# Patient Record
Sex: FEMALE | Race: WHITE
[De-identification: names, ages, dates, MRNs, and addresses within clinical notes are randomized per-mention and may not be internally consistent; named-entity substitution may affect disease eponyms.]

---

## 2017-02-20 ENCOUNTER — HOSPITAL ENCOUNTER (INPATIENT)
Dept: HOSPITAL 61 - SURG | Age: 57
LOS: 1 days | Discharge: HOME | DRG: 494 | End: 2017-02-21
Attending: ORTHOPAEDIC SURGERY | Admitting: ORTHOPAEDIC SURGERY
Payer: COMMERCIAL

## 2017-02-20 VITALS — DIASTOLIC BLOOD PRESSURE: 79 MMHG | SYSTOLIC BLOOD PRESSURE: 129 MMHG

## 2017-02-20 VITALS — SYSTOLIC BLOOD PRESSURE: 154 MMHG | DIASTOLIC BLOOD PRESSURE: 104 MMHG

## 2017-02-20 VITALS — DIASTOLIC BLOOD PRESSURE: 55 MMHG | SYSTOLIC BLOOD PRESSURE: 108 MMHG

## 2017-02-20 VITALS — SYSTOLIC BLOOD PRESSURE: 136 MMHG | DIASTOLIC BLOOD PRESSURE: 71 MMHG

## 2017-02-20 VITALS — WEIGHT: 168.31 LBS | BODY MASS INDEX: 28.04 KG/M2 | HEIGHT: 65 IN

## 2017-02-20 VITALS — SYSTOLIC BLOOD PRESSURE: 144 MMHG | DIASTOLIC BLOOD PRESSURE: 75 MMHG

## 2017-02-20 VITALS — DIASTOLIC BLOOD PRESSURE: 84 MMHG | SYSTOLIC BLOOD PRESSURE: 156 MMHG

## 2017-02-20 DIAGNOSIS — Z79.899: ICD-10-CM

## 2017-02-20 DIAGNOSIS — Z98.51: ICD-10-CM

## 2017-02-20 DIAGNOSIS — Z82.49: ICD-10-CM
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
## 2017-02-20 DIAGNOSIS — Z88.2: ICD-10-CM

## 2017-02-20 DIAGNOSIS — K21.9: ICD-10-CM

## 2017-02-20 DIAGNOSIS — S82.192G: Primary | ICD-10-CM

## 2017-02-20 DIAGNOSIS — E11.9: ICD-10-CM

## 2017-02-20 DIAGNOSIS — Z91.040: ICD-10-CM

## 2017-02-20 LAB
INR PPP: 1.1 (ref 0.8–1.1)
PROTHROMBIN TIME: 13.1 SEC (ref 11.7–14)

## 2017-02-20 PROCEDURE — 85610 PROTHROMBIN TIME: CPT

## 2017-02-20 PROCEDURE — 76000 FLUOROSCOPY <1 HR PHYS/QHP: CPT

## 2017-02-20 PROCEDURE — S0028 INJECTION, FAMOTIDINE, 20 MG: HCPCS

## 2017-02-20 PROCEDURE — 85018 HEMOGLOBIN: CPT

## 2017-02-20 PROCEDURE — 36415 COLL VENOUS BLD VENIPUNCTURE: CPT

## 2017-02-20 PROCEDURE — 85014 HEMATOCRIT: CPT

## 2017-02-20 PROCEDURE — 0QSH04Z REPOSITION LEFT TIBIA WITH INTERNAL FIXATION DEVICE, OPEN APPROACH: ICD-10-PCS | Performed by: ORTHOPAEDIC SURGERY

## 2017-02-20 RX ADMIN — SENNOSIDES AND DOCUSATE SODIUM SCH TAB: 8.6; 5 TABLET ORAL at 09:00

## 2017-02-20 RX ADMIN — HYDROCODONE BITARTRATE AND ACETAMINOPHEN PRN TAB: 7.5; 325 TABLET ORAL at 13:41

## 2017-02-20 RX ADMIN — CEFAZOLIN SCH MLS/HR: 1 INJECTION, POWDER, FOR SOLUTION INTRAVENOUS at 15:23

## 2017-02-20 RX ADMIN — MULTIPLE VITAMINS W/ MINERALS TAB SCH TAB: TAB at 09:00

## 2017-02-20 RX ADMIN — CEFAZOLIN PRN MLS/HR: 1 INJECTION, POWDER, FOR SOLUTION INTRAVENOUS at 12:20

## 2017-02-20 RX ADMIN — CEFAZOLIN PRN MLS/HR: 1 INJECTION, POWDER, FOR SOLUTION INTRAVENOUS at 12:19

## 2017-02-20 RX ADMIN — CEFAZOLIN SCH MLS/HR: 1 INJECTION, POWDER, FOR SOLUTION INTRAVENOUS at 20:37

## 2017-02-20 RX ADMIN — CEFAZOLIN PRN MLS/HR: 1 INJECTION, POWDER, FOR SOLUTION INTRAVENOUS at 07:43

## 2017-02-20 RX ADMIN — HYDROCODONE BITARTRATE AND ACETAMINOPHEN PRN TAB: 7.5; 325 TABLET ORAL at 11:44

## 2017-02-20 NOTE — OP
DATE OF SURGERY:  



SURGEON:  Randall Salamanca MD



ASSISTANT:  Sarah Ortiz.



ANESTHESIA:  General.



PREOPERATIVE DIAGNOSIS:  Left proximal third tibial fracture delayed union.



POSTOPERATIVE DIAGNOSIS:  Left proximal third tibial fracture delayed union.



PROCEDURE PERFORMED:

1.  Closed reduction intramedullary nailing of left tibial fracture, delayed

union.

2.  Application of bone graft, map3 product from [employer].



COMPONENTS INSERTED:

1.  A Stark [employer] Nephew 8 x 34 cm Trigen Clinton-Nail.

2.  Two proximal and 2 distal interlocking screws.



ESTIMATED BLOOD LOSS:  50 mL.



TOURNIQUET TIME:  20 minutes.



COMPLICATIONS:  None.



REASON FOR PROCEDURE:  The patient is a very pleasant 56-year-old female who

suffered a tibial plateau fracture treated with ORIF and bone grafting decades

ago.  Recently, she had suffered a transverse proximal third tibial shaft

fracture approximately 8-1/2 months ago.  We have been following this

clinically.  We had initially discussed operative versus nonoperative treatment

and she opted for nonoperative treatment.  Over the past, probably 4-6 weeks,

her pain again little bit worse and I felt she was settled into more varus.  She

did not have any progression of healing and had area of atrophic nonunion at the

anterior cortex.  She did have plenty of callus medially and laterally and

posteriorly on x-rays.  Because of this, we had a discussion of proceeding with

the above surgery with her and her  and they elected to proceed.



DESCRIPTION OF PROCEDURE:  The patient was greeted in the preoperative area by

myself.  Correct extremity was marked and verified.  She was taken to the

operative suite and antibiotics were started en route.  Once in the OR, she was

transferred gently supine to the OR table and had successful induction of

general anesthesia.  We then proceeded to secure to the bed and padded all

pressure points.  We then placed a nonsterile tourniquet to her left thigh.  We

then proceeded to prep and drape left lower extremity in usual sterile fashion

and conducted a standard preoperative timeout.  I then brought in the medium

sized triangle and flexed the knee up and then palpated, marked for her tibial

tuberosity patella and medial border of the patellar tendon and made a medial

incision over her knee and dissected subcutaneous tissue with electrocautery and

Largo  until I was through the paratenon.  I then bluntly dissected to

develop this layer with my digit.  I then used a guidewire under AP and lateral

at the knee to get a good starting point and trajectory.  I advanced a guidewire

down to the level of the callus.  I then gained entry into the proximal tibia

with the entry reamer.  I then attempted to pass the guidewire through the

callus and was unable to; therefore, I removed the guidewire and reintroduced

the guide pin and then using AP and lateral fluoroscopic imaging as well and

assistant holding valgus force at the fracture site.  I advanced the guide pin. 

It should be noted that prior to passing the hardware, I did perform somewhat of

a closed ostioclasis of this fracture to help maintain in a little bit better

position.  After advancing the guide pin through the callus, I then removed to

the guide pin and advanced the guidewire down confirming appropriate position

under AP and lateral fluoroscopic imaging at the distal tibia.  I then attempted

to pass the 8-1/2 reamer, but was unable to because of the callus.  I then 
withdrew

the guidewire and replaced the guide pin and then used a drill from the 7.3 mm

cannulated screw set to pass the callus.  I then withdrew the guide pin and

replaced the guidewire confirming good depth under AP and lateral fluoroscopic

imaging.  I then was able to ream with the 8-1/2 hand cutting reamer through the

callus.  After this, I reamed up and started getting really good shattered and 9

and went up to a 10 x half increments.  I then measured with lateral at the knee

again checking my guidewire was in good position distally.  I then selected my

nail and impacted my nail into position while my assistant was holding some

valgus force at the fracture site.  She was holding valgus force while reaming

as well.  With the nail seated and confirming good depth at the ankle and the

knee with fluoroscopy, I then placed two locking screws proximally through the

trocars after incising skin and bluntly dissecting down to bone with a hemostat

and then seated the trocars.  I then removed the trocar and the aiming arm

proximally for the nail and then using perfect circles technique, I placed two

distal interlocking screws at a 90 degree configuration.  After this, I used

C-arm to localize my fracture site and marked skin in accordance with this and

then made a skin incision and dissected down to the level of periosteum with

electrocautery and Bhavik.  I then incised the periosteum centered over the

fracture site.  At this point, I elected to place the tourniquet up.  It should

be noted that no tourniquet was insufflated while reaming was occurring for the

surgery.  With the tourniquet up, I then used a Duchesne, a curette and a small

rongeur to mobilize periosteum over the atrophic area of the fracture site. 

After debriding the fibrotic tissue, there really was not any callus, I then

placed my bone graft and impacted with digital pressure into this defect.  I

took care to work anteriorly, medially and as far laterally as I could.  With

bone graft packed in placed, I then closed periosteum with simple interrupted 0

Vicryl.  I then closed the deep layer and subcutaneous tissue with inverted

interrupted 0 Vicryl, and inverted interrupted 2-0 was used for the more

superficial subcutaneous tissue.  We then closed the stab incisions after

irrigating with staples.  The knee incision was then irrigated out with sterile

normal saline.  I then closed the peritenon with simple interrupted 0 Vicryl

followed by inverted interrupted 0 Vicryl for the deeper subcutaneous tissue and

the more superficial subcutaneous tissue was closed with inverted interrupted

2-0.  Running 4-0 Monocryl was buried subcuticular fashion was used for both

incisions.  The patient tolerated the surgery well.  At the conclusion of

surgery, the leg was cleansed and dried.  Xeroform sterile gauze and sterile

dressing were then applied as well as an Ace wrap to her left lower extremity. 

She tolerated surgery well.  No complications.  Prior to completion of wound

closure, all counts were reported correct x 2.  At the conclusion of surgery,

she was awakened from anesthesia, transferred gently supine to the hospital bed

and taken to PACU in stable and extubated condition.  Postop plan is to admit

her to the floor.  She will receive the DVT and antibiotic prophylaxis as well

as work with physical therapy.

 



______________________________

RANDALL SALAMANCA MD



DR:  NAPOLEON/marcus  JOB#:  [id number] / [id number]

DD:  02/20/2017 09:53  DT:  02/20/2017 23:09

BRIDGER

## 2017-02-20 NOTE — PDOC
BRIEF OPERATIVE NOTE


Date:  Feb 20, 2017


Pre-Op Diagnosis


L tibial fracture delayed union


Post-Op Diagnosis


same


Procedure Performed


CRIMN, grafting L tibial fx


Surgeon


Sosa


Assistant


Diana


Anesthesiologist


Hapgood


Anesthesia Type:  General, Local


Blood Loss


50mL


Findings


Atrophic area anteriorly at fracture site


Complications


none








AMBER SALAMANCA II, MD Feb 20, 2017 07:34

## 2017-02-21 VITALS
SYSTOLIC BLOOD PRESSURE: 115 MMHG | SYSTOLIC BLOOD PRESSURE: 115 MMHG | DIASTOLIC BLOOD PRESSURE: 57 MMHG | DIASTOLIC BLOOD PRESSURE: 57 MMHG | DIASTOLIC BLOOD PRESSURE: 57 MMHG | SYSTOLIC BLOOD PRESSURE: 115 MMHG

## 2017-02-21 VITALS — DIASTOLIC BLOOD PRESSURE: 62 MMHG | SYSTOLIC BLOOD PRESSURE: 128 MMHG

## 2017-02-21 VITALS — DIASTOLIC BLOOD PRESSURE: 55 MMHG | SYSTOLIC BLOOD PRESSURE: 122 MMHG

## 2017-02-21 VITALS — SYSTOLIC BLOOD PRESSURE: 124 MMHG | DIASTOLIC BLOOD PRESSURE: 55 MMHG

## 2017-02-21 LAB
HCT VFR BLD CALC: 35 % (ref 36–47)
HGB BLD-MCNC: 11.6 G/DL (ref 12–15.5)
INR PPP: 1.2 (ref 0.8–1.1)
MCHC RBC AUTO-ENTMCNC: 33 G/DL (ref 31–37)
PROTHROMBIN TIME: 14.8 SEC (ref 11.7–14)

## 2017-02-21 RX ADMIN — SENNOSIDES AND DOCUSATE SODIUM SCH TAB: 8.6; 5 TABLET ORAL at 08:24

## 2017-02-21 RX ADMIN — Medication PRN EACH: at 10:34

## 2017-02-21 RX ADMIN — MULTIPLE VITAMINS W/ MINERALS TAB SCH TAB: TAB at 08:24

## 2017-02-21 RX ADMIN — Medication PRN EACH: at 13:36

## 2017-02-21 RX ADMIN — HYDROCODONE BITARTRATE AND ACETAMINOPHEN PRN TAB: 7.5; 325 TABLET ORAL at 15:55

## 2017-02-21 RX ADMIN — HYDROCODONE BITARTRATE AND ACETAMINOPHEN PRN TAB: 7.5; 325 TABLET ORAL at 10:02

## 2017-02-21 RX ADMIN — HYDROCODONE BITARTRATE AND ACETAMINOPHEN PRN TAB: 7.5; 325 TABLET ORAL at 05:36

## 2017-02-21 RX ADMIN — CEFAZOLIN SCH MLS/HR: 1 INJECTION, POWDER, FOR SOLUTION INTRAVENOUS at 01:58

## 2017-02-21 NOTE — PDOC
ORTHO PROGRESS NOTES


Subjective


Her pain is tolerable. She was up with a walker yesterday, she got a little 

dizzy. No abdominal complaints. She complains of a scratchy throat. No 

difficulty breathing.


Vitals





 Vital Signs








  Date Time  Temp Pulse Resp B/P Pulse Ox O2 Delivery O2 Flow Rate FiO2


 


2/21/17 07:00  70 18 122/55 96 Room Air  


 


2/21/17 03:37 98.4       





 98.4       


 


2/20/17 18:11       2.0 








Labs





Laboratory Tests








Test


  2/20/17


12:50 2/21/17


05:25


 


Prothrombin Time


  13.1SEC


(11.7-14.0) 14.8SEC


(11.7-14.0)


 


Prothromb Time International


Ratio 1.1 (0.8-1.1) 


  1.2 (0.8-1.1) 


 


 


Hemoglobin


  


  11.6g/dL


(12.0-15.5)


 


Hematocrit


  


  35.0%


(36.0-47.0)


 


Mean Corpuscular Hemoglobin


Concent 


  33g/dL (31-37) 


 








Laboratory Tests








Test


  2/20/17


12:50 2/21/17


05:25


 


Prothrombin Time


  13.1SEC


(11.7-14.0) 14.8SEC


(11.7-14.0)


 


Prothromb Time International


Ratio 1.1 (0.8-1.1) 


  1.2 (0.8-1.1) 


 


 


Hemoglobin


  


  11.6g/dL


(12.0-15.5)


 


Hematocrit


  


  35.0%


(36.0-47.0)


 


Mean Corpuscular Hemoglobin


Concent 


  33g/dL (31-37) 


 








Notes


She is awake and alert and in bed. Examination of her left lower extremity 

reveals some bloody drainage proximally. The remainder of the incisions are 

clean dry and intact. Compartments are soft. No pain with passive range of 

motion. Results pedis 2+. Sensation intact to light touch throughout the 

forefoot. EHL and FHL are 5/5.


Assessment and Plan


We will see how she does with therapy today, she may go home later today. We 

will plan on Coumadin for anticoagulation, but she would like to discuss this 

with family.








AMBER SALAMANCA II, MD Feb 21, 2017 08:57

## 2017-02-21 NOTE — DISCH
DISCHARGE INSTRUCTIONS


Condition on Discharge


Condition on Discharge:  Stable





Activity After Discharge


Activity Instructions for Disc:  Other, see below


Bathing Instructions:  Shower-keep dressing dry


Weight Bearing Status after Di:  As tolerated





Diet after Discharge


Diet after Discharge:  Regular





Wound Incision Care


Wound/Incision Care:  Ice to area for comfort, Keep wound/cast CDI, Change 

dressing





Contacting the [facility] after DC


Call your doctor for:  Concerns you may have





Follow-Up


Follow up with:  Sosa in 2wks





Treatment/Equipment after DC


Adaptive Equipment Issued:  Crutches





Warfarin Follow-Up


Warfarin Follow UP:  per Pharmacy








AMBER SALAMANCA II, MD Feb 21, 2017 08:49

## 2017-02-22 NOTE — PDOC3
Discharge Summary


Visit Information


Date of Admission:  Feb 20, 2017


Date of Discharge:  Feb 21, 2017


Admitting Diagnosis:  delayed union left tibial fracture


Final Diagnosis


 Problems


Medical Problems:


(1) Tibial fracture


Status: Acute  











Brief Hospital Course


Allergies





 Allergies








Coded Allergies Type Severity Reaction Last Updated Verified


 


  Latex, Natural Rubber Allergy Severe eyes swell shut, copiuos amounts of snot 

5/17/14 Yes


 


  Sulfa (Sulfonamide Antibiotics) Allergy Intermediate rash 5/17/14 Yes


 


  nickel Allergy Intermediate Rash 2/21/17 Yes








Vital Signs





 Vital Signs








  Date Time  Temp Pulse Resp B/P Pulse Ox O2 Delivery O2 Flow Rate FiO2


 


2/21/17 16:56      Room Air  


 


2/21/17 15:00 97.7 75 18 115/57 96   





 97.7       








Lab Results





Laboratory Tests








Test


  2/20/17


12:50 2/21/17


05:25


 


Prothrombin Time


  13.1SEC


(11.7-14.0) 14.8SEC


(11.7-14.0)


 


Prothromb Time International


Ratio 1.1 (0.8-1.1) 


  1.2 (0.8-1.1) 


 


 


Hemoglobin


  


  11.6g/dL


(12.0-15.5)


 


Hematocrit


  


  35.0%


(36.0-47.0)


 


Mean Corpuscular Hemoglobin


Concent 


  33g/dL (31-37) 


 








Brief Hospital Course


Ms. Rm  is a 56 old female who had seen initially in consultation after 

a ground-level fall resulted in proximal third tibial fracture several months 

ago. She had initially progressed well after we discussed treatment options and 

she elected for nonoperative treatment. Lately, she had drifted into slight 

varus and had worsening pain. Because of this we had a discussion the risks, 

benefits, and alternatives after clinical and radiographic evidence and 

clinical evidence seemed to show a delayed union. Her  discuss this and 

answered her questions and elected proceed with surgery. She tolerated surgery 

well and recovered well from anesthesia and the PACU. After this she was taken 

to the surgical floor for DVT and antibiotic prophylaxis as well as IV pain 

medicine and to begin a rehabilitation. She progressed well with physical 

therapy and was able to maintain her ADLs and ambulate with crutches prior to 

discharge. She remained hemodynamically stable and afebrile. Her pain was 

controlled on oral pain medicine. Normal bowel and bladder function. Her 

hospital course was essentially uneventful.





Discharge Information


Condition at Discharge:  Stable


Follow Up:  Weeks


Disposition/Orders:  D/C to Home


Scheduled


Ergocalciferol (Vitamin D2) (Vitamin D2) 1 CAP PO WEEKLY (Reported) 


Levothyroxine Sodium (Levothyroxine Sodium) 1 TAB PO DAILY (Reported) 


Ranitidine Hcl (Zantac) 1 TAB PO BID (Reported) 


Simvastatin (Simvastatin) 1 TAB PO QHS (Reported) 


Tramadol Hcl (Tramadol Hcl) 1 TAB PO PRN Q6HRS (Reported) 





Scheduled PRN


Acyclovir (Acyclovir) 1 TAB PO PRN TID PRN PRN SEE COMMENTS (Reported) 


Hydrocodone/Apap 5-325 (Norco 5-325 Tablet) 1-2 TAB PO PRN Q6HRS PRN PRN PAIN (

Reported) 


Zolpidem Tartrate (Ambien) 1 TAB PO PRN QHS PRN PRN INSOMNIA (Reported) 





Miscellaneous Medications


Fexofenadine Hcl (Allegra Allergy) 180 MG PO (Reported) 





Discontinued Medications


Aspirin (Aspirin) 1 TAB PO DAILY (Reported) 


Ibuprofen (Ibuprofen) 400 MG PO PRN PRN PRN INFLAMMATION (Reported) 





Patient Instructions


Patient Instructions


She'll be discharged weightbearing as tolerated with crutches as needed. Wound 

care instructions were given. She will be on Coumadin for DVT prophylaxis. We'

ll see her back in 2 weeks, sooner should problems arise








AMBER SALAMANCA II, MD Feb 22, 2017 10:55

## 2017-03-22 ENCOUNTER — HOSPITAL ENCOUNTER (OUTPATIENT)
Dept: HOSPITAL 61 - SPEC | Age: 57
Discharge: HOME | End: 2017-03-22
Attending: FAMILY MEDICINE
Payer: COMMERCIAL

## 2017-03-22 DIAGNOSIS — S81.802A: Primary | ICD-10-CM

## 2017-03-22 PROCEDURE — 87070 CULTURE OTHR SPECIMN AEROBIC: CPT

## 2017-03-22 PROCEDURE — 87205 SMEAR GRAM STAIN: CPT

## 2018-02-15 ENCOUNTER — HOSPITAL ENCOUNTER (OUTPATIENT)
Dept: HOSPITAL 61 - MAMMO | Age: 58
Discharge: HOME | End: 2018-02-15
Attending: FAMILY MEDICINE
Payer: COMMERCIAL

## 2018-02-15 DIAGNOSIS — Z12.31: Primary | ICD-10-CM

## 2018-02-15 PROCEDURE — 77063 BREAST TOMOSYNTHESIS BI: CPT

## 2018-02-15 PROCEDURE — 77067 SCR MAMMO BI INCL CAD: CPT

## 2019-03-14 ENCOUNTER — HOSPITAL ENCOUNTER (OUTPATIENT)
Dept: HOSPITAL 61 - MAMMO | Age: 59
Discharge: HOME | End: 2019-03-14
Attending: FAMILY MEDICINE
Payer: COMMERCIAL

## 2019-03-14 DIAGNOSIS — R92.8: ICD-10-CM

## 2019-03-14 DIAGNOSIS — Z12.31: Primary | ICD-10-CM

## 2019-03-14 PROCEDURE — 77067 SCR MAMMO BI INCL CAD: CPT

## 2019-03-14 PROCEDURE — 77063 BREAST TOMOSYNTHESIS BI: CPT

## 2019-03-14 NOTE — RAD
DATE: 3/14/2019



EXAM: MAMMO MEREDITH SCREENING BILATERAL



HISTORY: Routine screening



COMPARISON: 2/15/2018



This study was interpreted with the benefit of Computerized Aided Detection

(CAD).





Breast Density: HETERO The breast parenchyma is heterogenously dense, which

could reduce sensitivity of mammography. Breast parenchyma level C.





FINDINGS: 2-D and 3-D tomosynthesis imaging was performed in CC and MLO

projections.  No new or enlarging breast densities are seen.  Minimal benign

type calcification is present.  No suspicious microcalcifications have

developed.  





IMPRESSION: Stable mammograms without evidence of malignancy.





BI-RADS CATEGORY: 2 BENIGN FINDING(S)



RECOMMENDED FOLLOW-UP: 12M 12 MONTH FOLLOW-UP



PQRS compliance statement: Patient information was entered into a reminder

system with a target due date     for the next mammogram.



Mammography is a sensitive method for finding small breast cancers, but it

does not detect them all and is not a substitute for careful clinical

examination.  A negative mammogram does not negate a clinically suspicious

finding and should not result in delay in biopsying a clinically suspicious

abnormality.



"Our facility is accredited by the [employer] Mammography

Program."

## 2019-04-10 ENCOUNTER — HOSPITAL ENCOUNTER (OUTPATIENT)
Dept: HOSPITAL 61 - SURG | Age: 59
Discharge: HOME | End: 2019-04-10
Attending: INTERNAL MEDICINE
Payer: COMMERCIAL

## 2019-04-10 VITALS
SYSTOLIC BLOOD PRESSURE: 103 MMHG | DIASTOLIC BLOOD PRESSURE: 61 MMHG | DIASTOLIC BLOOD PRESSURE: 61 MMHG | SYSTOLIC BLOOD PRESSURE: 103 MMHG | SYSTOLIC BLOOD PRESSURE: 103 MMHG | DIASTOLIC BLOOD PRESSURE: 61 MMHG | SYSTOLIC BLOOD PRESSURE: 103 MMHG | DIASTOLIC BLOOD PRESSURE: 61 MMHG | DIASTOLIC BLOOD PRESSURE: 61 MMHG | SYSTOLIC BLOOD PRESSURE: 103 MMHG | DIASTOLIC BLOOD PRESSURE: 61 MMHG | DIASTOLIC BLOOD PRESSURE: 61 MMHG | SYSTOLIC BLOOD PRESSURE: 103 MMHG | SYSTOLIC BLOOD PRESSURE: 103 MMHG | SYSTOLIC BLOOD PRESSURE: 103 MMHG | DIASTOLIC BLOOD PRESSURE: 61 MMHG | SYSTOLIC BLOOD PRESSURE: 103 MMHG | DIASTOLIC BLOOD PRESSURE: 61 MMHG

## 2019-04-10 DIAGNOSIS — E03.9: ICD-10-CM

## 2019-04-10 DIAGNOSIS — Z88.2: ICD-10-CM

## 2019-04-10 DIAGNOSIS — Z86.010: ICD-10-CM

## 2019-04-10 DIAGNOSIS — E78.5: ICD-10-CM

## 2019-04-10 DIAGNOSIS — K64.0: ICD-10-CM

## 2019-04-10 DIAGNOSIS — Z91.040: ICD-10-CM

## 2019-04-10 DIAGNOSIS — Z79.899: ICD-10-CM

## 2019-04-10 DIAGNOSIS — Z98.51: ICD-10-CM

## 2019-04-10 DIAGNOSIS — K21.9: ICD-10-CM

## 2019-04-10 DIAGNOSIS — Z80.0: ICD-10-CM

## 2019-04-10 DIAGNOSIS — Z98.890: ICD-10-CM

## 2019-04-10 DIAGNOSIS — K57.30: Primary | ICD-10-CM

## 2019-04-10 DIAGNOSIS — I10: ICD-10-CM

## 2019-04-10 DIAGNOSIS — Z79.82: ICD-10-CM

## 2019-04-10 DIAGNOSIS — Z72.89: ICD-10-CM

## 2019-04-10 DIAGNOSIS — Z82.49: ICD-10-CM

## 2019-04-10 PROCEDURE — 45378 DIAGNOSTIC COLONOSCOPY: CPT

## 2019-04-10 NOTE — CONS
DATE OF CONSULTATION:  



REFERRING PHYSICIAN:  Dr. Jesús Ruiz.



HISTORY OF PRESENT ILLNESS:  This 59-year-old  female with past medical

history significant for colonic polyps, hyperlipidemia, hypertension,

hypothyroidism and GERD as well as diverticulosis is seen for interval exam. 

She has had no diarrhea or constipation, no melena or hematochezia.  Weight and

appetite are stable.  Cologuard test was positive for prior history of polyps,

so she is here today for further evaluation.



PAST MEDICAL HISTORY:  Hypothyroidism, hypertension, GERD, and hyperlipidemia.



ALLERGIES:  LATEX AND SULFA.



MEDICATIONS:  Include aspirin, vitamin D, Allegra, levothyroxine, lisinopril,

omeprazole, simvastatin, triamterene/hydrochlorothiazide and zolpidem.



SOCIAL HISTORY:  Social drinker, nonsmoker.



FAMILY HISTORY:  Significant for stomach cancer and coronary artery disease.



REVIEW OF SYSTEMS:  Per records.



PHYSICAL EXAMINATION:

GENERAL:  Reveals a well-nourished, well-developed  female who is

alert, in no acute distress.

VITAL SIGNS:  Pulse is 85, respirations 18, blood pressure is 130/75.

HEENT:  Normocephalic and atraumatic head.  Pupils and extraocular muscles are

not tested.  Sclerae anicteric.

NECK:  Supple.

LUNGS:  Clear.

CARDIOVASCULAR:  Reveals an S1, S2 without S3, S4 or appreciable murmur.

ABDOMEN:  With a soft abdomen, normal bowel sounds without appreciable

hepatosplenomegaly.

EXTREMITIES:  Reveals no cyanosis, clubbing or edema.



IMPRESSION AND PLAN:  Colorectal screening with a positive Cologuard and history

of colonic polyps is warranted at this time.  Risks and benefits of procedure

including risk of hemorrhage and perforation discussed.  The patient is willing

to proceed.

 



______________________________

DAVID MENESES MD



DR:  SSP/marcus  JOB#:  [id number] / [id number]

DD:  04/10/2019 08:20  DT:  04/10/2019 22:05

## 2019-06-07 ENCOUNTER — HOSPITAL ENCOUNTER (OUTPATIENT)
Dept: HOSPITAL 61 - LAB | Age: 59
Discharge: HOME | End: 2019-06-07
Attending: FAMILY MEDICINE
Payer: COMMERCIAL

## 2019-06-07 DIAGNOSIS — E03.9: ICD-10-CM

## 2019-06-07 DIAGNOSIS — Z91.040: ICD-10-CM

## 2019-06-07 DIAGNOSIS — R73.09: ICD-10-CM

## 2019-06-07 DIAGNOSIS — Z88.2: ICD-10-CM

## 2019-06-07 DIAGNOSIS — E78.5: Primary | ICD-10-CM

## 2019-06-07 DIAGNOSIS — Z88.8: ICD-10-CM

## 2019-06-07 LAB
ALBUMIN SERPL-MCNC: 4.6 G/DL (ref 3.4–5)
ALBUMIN/GLOB SERPL: 1.3 {RATIO} (ref 1–1.7)
ALP SERPL-CCNC: 72 U/L (ref 46–116)
ALT SERPL-CCNC: 28 U/L (ref 14–59)
ANION GAP SERPL CALC-SCNC: 11 MMOL/L (ref 6–14)
AST SERPL-CCNC: 18 U/L (ref 15–37)
BASOPHILS # BLD AUTO: 0.1 X10^3/UL (ref 0–0.2)
BASOPHILS NFR BLD: 1 % (ref 0–3)
BILIRUB SERPL-MCNC: 0.7 MG/DL (ref 0.2–1)
BUN SERPL-MCNC: 21 MG/DL (ref 7–20)
BUN/CREAT SERPL: 19 (ref 6–20)
CALCIUM SERPL-MCNC: 9.9 MG/DL (ref 8.5–10.1)
CHLORIDE SERPL-SCNC: 99 MMOL/L (ref 98–107)
CHOLEST SERPL-MCNC: 183 MG/DL (ref 0–200)
CHOLEST/HDLC SERPL: 2.8 {RATIO}
CO2 SERPL-SCNC: 29 MMOL/L (ref 21–32)
CREAT SERPL-MCNC: 1.1 MG/DL (ref 0.6–1)
EOSINOPHIL NFR BLD: 0.1 X10^3/UL (ref 0–0.7)
EOSINOPHIL NFR BLD: 2 % (ref 0–3)
ERYTHROCYTE [DISTWIDTH] IN BLOOD BY AUTOMATED COUNT: 14.2 % (ref 11.5–14.5)
GFR SERPLBLD BASED ON 1.73 SQ M-ARVRAT: 50.5 ML/MIN
GLOBULIN SER-MCNC: 3.6 G/DL (ref 2.2–3.8)
GLUCOSE SERPL-MCNC: 124 MG/DL (ref 70–99)
HCT VFR BLD CALC: 36.8 % (ref 36–47)
HDLC SERPL-MCNC: 65 MG/DL (ref 40–60)
HGB BLD-MCNC: 12.5 G/DL (ref 12–15.5)
LDLC: 108 MG/DL (ref 0–100)
LYMPHOCYTES # BLD: 1.8 X10^3/UL (ref 1–4.8)
LYMPHOCYTES NFR BLD AUTO: 27 % (ref 24–48)
MCH RBC QN AUTO: 30 PG (ref 25–35)
MCHC RBC AUTO-ENTMCNC: 34 G/DL (ref 31–37)
MCV RBC AUTO: 89 FL (ref 79–100)
MONO #: 0.6 X10^3/UL (ref 0–1.1)
MONOCYTES NFR BLD: 10 % (ref 0–9)
NEUT #: 4.1 X10^3UL (ref 1.8–7.7)
NEUTROPHILS NFR BLD AUTO: 61 % (ref 31–73)
PLATELET # BLD AUTO: 226 X10^3/UL (ref 140–400)
POTASSIUM SERPL-SCNC: 4 MMOL/L (ref 3.5–5.1)
PROT SERPL-MCNC: 8.2 G/DL (ref 6.4–8.2)
RBC # BLD AUTO: 4.12 X10^6/UL (ref 3.5–5.4)
SODIUM SERPL-SCNC: 139 MMOL/L (ref 136–145)
T4 FREE SERPL-MCNC: 1.24 NG/DL (ref 0.76–1.46)
THYROID STIM HORMONE (TSH): 2.04 UIU/ML (ref 0.36–3.74)
TRIGL SERPL-MCNC: 48 MG/DL (ref 0–150)
VLDLC: 10 MG/DL (ref 0–40)
WBC # BLD AUTO: 6.8 X10^3/UL (ref 4–11)

## 2019-06-07 PROCEDURE — 36415 COLL VENOUS BLD VENIPUNCTURE: CPT

## 2019-06-07 PROCEDURE — 85025 COMPLETE CBC W/AUTO DIFF WBC: CPT

## 2019-06-07 PROCEDURE — 80061 LIPID PANEL: CPT

## 2019-06-07 PROCEDURE — 83036 HEMOGLOBIN GLYCOSYLATED A1C: CPT

## 2019-06-07 PROCEDURE — 82306 VITAMIN D 25 HYDROXY: CPT

## 2019-06-07 PROCEDURE — 84443 ASSAY THYROID STIM HORMONE: CPT

## 2019-06-07 PROCEDURE — 80053 COMPREHEN METABOLIC PANEL: CPT

## 2019-06-07 PROCEDURE — 84439 ASSAY OF FREE THYROXINE: CPT

## 2019-06-08 LAB — HBA1C MFR BLD: 5.8 % (ref 4.8–5.6)

## 2020-01-02 ENCOUNTER — HOSPITAL ENCOUNTER (OUTPATIENT)
Dept: HOSPITAL 61 - LAB | Age: 60
Discharge: HOME | End: 2020-01-02
Attending: FAMILY MEDICINE
Payer: COMMERCIAL

## 2020-01-02 DIAGNOSIS — E78.5: ICD-10-CM

## 2020-01-02 DIAGNOSIS — E74.39: Primary | ICD-10-CM

## 2020-01-02 DIAGNOSIS — E03.9: ICD-10-CM

## 2020-01-02 LAB
ALBUMIN SERPL-MCNC: 4.3 G/DL (ref 3.4–5)
ALBUMIN/GLOB SERPL: 1.2 {RATIO} (ref 1–1.7)
ALP SERPL-CCNC: 64 U/L (ref 46–116)
ALT SERPL-CCNC: 25 U/L (ref 14–59)
ANION GAP SERPL CALC-SCNC: 12 MMOL/L (ref 6–14)
AST SERPL-CCNC: 16 U/L (ref 15–37)
BILIRUB SERPL-MCNC: 1 MG/DL (ref 0.2–1)
BUN SERPL-MCNC: 32 MG/DL (ref 7–20)
BUN/CREAT SERPL: 36 (ref 6–20)
CALCIUM SERPL-MCNC: 9.4 MG/DL (ref 8.5–10.1)
CHLORIDE SERPL-SCNC: 98 MMOL/L (ref 98–107)
CHOLEST SERPL-MCNC: 199 MG/DL (ref 0–200)
CHOLEST/HDLC SERPL: 2.9 {RATIO}
CO2 SERPL-SCNC: 26 MMOL/L (ref 21–32)
CREAT SERPL-MCNC: 0.9 MG/DL (ref 0.6–1)
GFR SERPLBLD BASED ON 1.73 SQ M-ARVRAT: 63.7 ML/MIN
GLOBULIN SER-MCNC: 3.6 G/DL (ref 2.2–3.8)
GLUCOSE SERPL-MCNC: 117 MG/DL (ref 70–99)
HDLC SERPL-MCNC: 68 MG/DL (ref 40–60)
LDLC: 118 MG/DL (ref 0–100)
POTASSIUM SERPL-SCNC: 4 MMOL/L (ref 3.5–5.1)
PROT SERPL-MCNC: 7.9 G/DL (ref 6.4–8.2)
SODIUM SERPL-SCNC: 136 MMOL/L (ref 136–145)
T4 FREE SERPL-MCNC: 1.25 NG/DL (ref 0.76–1.46)
THYROID STIM HORMONE (TSH): 0.16 UIU/ML (ref 0.36–3.74)
TRIGL SERPL-MCNC: 67 MG/DL (ref 0–150)
VLDLC: 13 MG/DL (ref 0–40)

## 2020-01-02 PROCEDURE — 80053 COMPREHEN METABOLIC PANEL: CPT

## 2020-01-02 PROCEDURE — 84443 ASSAY THYROID STIM HORMONE: CPT

## 2020-01-02 PROCEDURE — 36415 COLL VENOUS BLD VENIPUNCTURE: CPT

## 2020-01-02 PROCEDURE — 84439 ASSAY OF FREE THYROXINE: CPT

## 2020-01-02 PROCEDURE — 80061 LIPID PANEL: CPT

## 2020-01-02 PROCEDURE — 83036 HEMOGLOBIN GLYCOSYLATED A1C: CPT

## 2020-01-03 LAB — HBA1C MFR BLD: 5.7 % (ref 4.8–5.6)

## 2020-11-09 ENCOUNTER — HOSPITAL ENCOUNTER (OUTPATIENT)
Dept: HOSPITAL 61 - LAB | Age: 60
End: 2020-11-09
Attending: FAMILY MEDICINE
Payer: COMMERCIAL

## 2020-11-09 DIAGNOSIS — E03.9: ICD-10-CM

## 2020-11-09 DIAGNOSIS — E74.39: Primary | ICD-10-CM

## 2020-11-09 LAB
ANION GAP SERPL CALC-SCNC: 8 MMOL/L (ref 6–14)
BUN SERPL-MCNC: 18 MG/DL (ref 7–20)
CALCIUM SERPL-MCNC: 9.6 MG/DL (ref 8.5–10.1)
CHLORIDE SERPL-SCNC: 96 MMOL/L (ref 98–107)
CO2 SERPL-SCNC: 29 MMOL/L (ref 21–32)
CREAT SERPL-MCNC: 0.8 MG/DL (ref 0.6–1)
GFR SERPLBLD BASED ON 1.73 SQ M-ARVRAT: 72.7 ML/MIN
GLUCOSE SERPL-MCNC: 112 MG/DL (ref 70–99)
POTASSIUM SERPL-SCNC: 4 MMOL/L (ref 3.5–5.1)
SODIUM SERPL-SCNC: 133 MMOL/L (ref 136–145)
T4 FREE SERPL-MCNC: 1.58 NG/DL (ref 0.76–1.46)
THYROID STIM HORMONE (TSH): 0.28 UIU/ML (ref 0.36–3.74)

## 2020-11-09 PROCEDURE — 84443 ASSAY THYROID STIM HORMONE: CPT

## 2020-11-09 PROCEDURE — 84439 ASSAY OF FREE THYROXINE: CPT

## 2020-11-09 PROCEDURE — 36415 COLL VENOUS BLD VENIPUNCTURE: CPT

## 2020-11-09 PROCEDURE — 83036 HEMOGLOBIN GLYCOSYLATED A1C: CPT

## 2020-11-09 PROCEDURE — 80048 BASIC METABOLIC PNL TOTAL CA: CPT

## 2020-11-10 LAB — HBA1C MFR BLD: 5.5 % (ref 4.8–5.6)

## 2021-03-11 ENCOUNTER — HOSPITAL ENCOUNTER (OUTPATIENT)
Dept: HOSPITAL 61 - LAB | Age: 61
End: 2021-03-11
Attending: FAMILY MEDICINE
Payer: COMMERCIAL

## 2021-03-11 ENCOUNTER — HOSPITAL ENCOUNTER (OUTPATIENT)
Dept: HOSPITAL 61 - LAB | Age: 61
End: 2021-03-11
Payer: COMMERCIAL

## 2021-03-11 DIAGNOSIS — L23.9: Primary | ICD-10-CM

## 2021-03-11 DIAGNOSIS — E74.39: Primary | ICD-10-CM

## 2021-03-11 LAB
ALBUMIN SERPL-MCNC: 3.8 G/DL (ref 3.4–5)
ALBUMIN SERPL-MCNC: 3.8 G/DL (ref 3.4–5)
ALBUMIN/GLOB SERPL: 1.1 {RATIO} (ref 1–1.7)
ALBUMIN/GLOB SERPL: 1.1 {RATIO} (ref 1–1.7)
ALP SERPL-CCNC: 75 U/L (ref 46–116)
ALP SERPL-CCNC: 75 U/L (ref 46–116)
ALT SERPL-CCNC: 34 U/L (ref 14–59)
ALT SERPL-CCNC: 34 U/L (ref 14–59)
ANION GAP SERPL CALC-SCNC: 7 MMOL/L (ref 6–14)
ANION GAP SERPL CALC-SCNC: 7 MMOL/L (ref 6–14)
AST SERPL-CCNC: 19 U/L (ref 15–37)
AST SERPL-CCNC: 19 U/L (ref 15–37)
BASOPHILS # BLD AUTO: 0.1 X10^3/UL (ref 0–0.2)
BASOPHILS NFR BLD: 1 % (ref 0–3)
BILIRUB SERPL-MCNC: 0.7 MG/DL (ref 0.2–1)
BILIRUB SERPL-MCNC: 0.7 MG/DL (ref 0.2–1)
BUN SERPL-MCNC: 20 MG/DL (ref 7–20)
BUN SERPL-MCNC: 20 MG/DL (ref 7–20)
BUN/CREAT SERPL: 25 (ref 6–20)
BUN/CREAT SERPL: 25 (ref 6–20)
CALCIUM SERPL-MCNC: 8.8 MG/DL (ref 8.5–10.1)
CALCIUM SERPL-MCNC: 8.8 MG/DL (ref 8.5–10.1)
CHLORIDE SERPL-SCNC: 101 MMOL/L (ref 98–107)
CHLORIDE SERPL-SCNC: 101 MMOL/L (ref 98–107)
CHOLEST SERPL-MCNC: 200 MG/DL (ref 0–200)
CHOLEST/HDLC SERPL: 2.9 {RATIO}
CO2 SERPL-SCNC: 28 MMOL/L (ref 21–32)
CO2 SERPL-SCNC: 28 MMOL/L (ref 21–32)
CREAT SERPL-MCNC: 0.8 MG/DL (ref 0.6–1)
CREAT SERPL-MCNC: 0.8 MG/DL (ref 0.6–1)
EOSINOPHIL NFR BLD: 0.2 X10^3/UL (ref 0–0.7)
EOSINOPHIL NFR BLD: 3 % (ref 0–3)
ERYTHROCYTE [DISTWIDTH] IN BLOOD BY AUTOMATED COUNT: 13.9 % (ref 11.5–14.5)
ERYTHROCYTE [DISTWIDTH] IN BLOOD BY AUTOMATED COUNT: 14.2 % (ref 11.5–14.5)
GFR SERPLBLD BASED ON 1.73 SQ M-ARVRAT: 72.7 ML/MIN
GFR SERPLBLD BASED ON 1.73 SQ M-ARVRAT: 72.7 ML/MIN
GLUCOSE SERPL-MCNC: 99 MG/DL (ref 70–99)
GLUCOSE SERPL-MCNC: 99 MG/DL (ref 70–99)
HCT VFR BLD CALC: 41.3 % (ref 36–47)
HCT VFR BLD CALC: 42 % (ref 36–47)
HDLC SERPL-MCNC: 70 MG/DL (ref 40–60)
HGB BLD-MCNC: 14.2 G/DL (ref 12–15.5)
HGB BLD-MCNC: 14.3 G/DL (ref 12–15.5)
LDLC: 117 MG/DL (ref 0–100)
LYMPHOCYTES # BLD: 1.5 X10^3/UL (ref 1–4.8)
LYMPHOCYTES NFR BLD AUTO: 23 % (ref 24–48)
MCH RBC QN AUTO: 31 PG (ref 25–35)
MCH RBC QN AUTO: 31 PG (ref 25–35)
MCHC RBC AUTO-ENTMCNC: 34 G/DL (ref 31–37)
MCHC RBC AUTO-ENTMCNC: 34 G/DL (ref 31–37)
MCV RBC AUTO: 89 FL (ref 79–100)
MCV RBC AUTO: 89 FL (ref 79–100)
MONO #: 0.7 X10^3/UL (ref 0–1.1)
MONOCYTES NFR BLD: 10 % (ref 0–9)
NEUT #: 4.2 X10^3/UL (ref 1.8–7.7)
NEUTROPHILS NFR BLD AUTO: 64 % (ref 31–73)
PLATELET # BLD AUTO: 235 X10^3/UL (ref 140–400)
PLATELET # BLD AUTO: 237 X10^3/UL (ref 140–400)
POTASSIUM SERPL-SCNC: 4.4 MMOL/L (ref 3.5–5.1)
POTASSIUM SERPL-SCNC: 4.4 MMOL/L (ref 3.5–5.1)
PROT SERPL-MCNC: 7.4 G/DL (ref 6.4–8.2)
PROT SERPL-MCNC: 7.4 G/DL (ref 6.4–8.2)
RBC # BLD AUTO: 4.64 X10^6/UL (ref 3.5–5.4)
RBC # BLD AUTO: 4.7 X10^6/UL (ref 3.5–5.4)
SODIUM SERPL-SCNC: 136 MMOL/L (ref 136–145)
SODIUM SERPL-SCNC: 136 MMOL/L (ref 136–145)
T4 FREE SERPL-MCNC: 1.43 NG/DL (ref 0.76–1.46)
T4 FREE SERPL-MCNC: 1.43 NG/DL (ref 0.76–1.46)
THYROID STIM HORMONE (TSH): 0.4 UIU/ML (ref 0.36–3.74)
THYROID STIM HORMONE (TSH): 0.4 UIU/ML (ref 0.36–3.74)
TRIGL SERPL-MCNC: 64 MG/DL (ref 0–150)
VLDLC: 13 MG/DL (ref 0–40)
WBC # BLD AUTO: 6.6 X10^3/UL (ref 4–11)
WBC # BLD AUTO: 6.6 X10^3/UL (ref 4–11)

## 2021-03-11 PROCEDURE — 85027 COMPLETE CBC AUTOMATED: CPT

## 2021-03-11 PROCEDURE — 83036 HEMOGLOBIN GLYCOSYLATED A1C: CPT

## 2021-03-11 PROCEDURE — 84443 ASSAY THYROID STIM HORMONE: CPT

## 2021-03-11 PROCEDURE — 82785 ASSAY OF IGE: CPT

## 2021-03-11 PROCEDURE — 85025 COMPLETE CBC W/AUTO DIFF WBC: CPT

## 2021-03-11 PROCEDURE — 84439 ASSAY OF FREE THYROXINE: CPT

## 2021-03-11 PROCEDURE — 80053 COMPREHEN METABOLIC PANEL: CPT

## 2021-03-11 PROCEDURE — 82306 VITAMIN D 25 HYDROXY: CPT

## 2021-03-11 PROCEDURE — 83516 IMMUNOASSAY NONANTIBODY: CPT

## 2021-03-11 PROCEDURE — 80061 LIPID PANEL: CPT

## 2021-03-11 PROCEDURE — 86803 HEPATITIS C AB TEST: CPT

## 2021-03-11 PROCEDURE — 36415 COLL VENOUS BLD VENIPUNCTURE: CPT

## 2021-03-12 LAB — HBA1C MFR BLD: 5.7 % (ref 4.8–5.6)

## 2021-09-28 ENCOUNTER — HOSPITAL ENCOUNTER (OUTPATIENT)
Dept: HOSPITAL 61 - LAB | Age: 61
End: 2021-09-28
Attending: FAMILY MEDICINE
Payer: COMMERCIAL

## 2021-09-28 DIAGNOSIS — E03.9: ICD-10-CM

## 2021-09-28 DIAGNOSIS — E78.5: Primary | ICD-10-CM

## 2021-09-28 DIAGNOSIS — E74.39: ICD-10-CM

## 2021-09-28 DIAGNOSIS — E55.9: ICD-10-CM

## 2021-09-28 LAB
ALBUMIN SERPL-MCNC: 3.8 G/DL (ref 3.4–5)
ALBUMIN/GLOB SERPL: 1 {RATIO} (ref 1–1.7)
ALP SERPL-CCNC: 79 U/L (ref 46–116)
ALT SERPL-CCNC: 29 U/L (ref 14–59)
ANION GAP SERPL CALC-SCNC: 6 MMOL/L (ref 6–14)
AST SERPL-CCNC: 21 U/L (ref 15–37)
BILIRUB SERPL-MCNC: 0.9 MG/DL (ref 0.2–1)
BUN SERPL-MCNC: 21 MG/DL (ref 7–20)
BUN/CREAT SERPL: 26 (ref 6–20)
CALCIUM SERPL-MCNC: 9 MG/DL (ref 8.5–10.1)
CHLORIDE SERPL-SCNC: 100 MMOL/L (ref 98–107)
CHOLEST SERPL-MCNC: 204 MG/DL (ref 0–200)
CHOLEST/HDLC SERPL: 2.4 {RATIO}
CO2 SERPL-SCNC: 31 MMOL/L (ref 21–32)
CREAT SERPL-MCNC: 0.8 MG/DL (ref 0.6–1)
ERYTHROCYTE [DISTWIDTH] IN BLOOD BY AUTOMATED COUNT: 14.5 % (ref 11.5–14.5)
GFR SERPLBLD BASED ON 1.73 SQ M-ARVRAT: 72.4 ML/MIN
GLUCOSE SERPL-MCNC: 120 MG/DL (ref 70–99)
HBA1C MFR BLD: 5.7 % (ref 4.8–5.6)
HCT VFR BLD CALC: 40.2 % (ref 36–47)
HDLC SERPL-MCNC: 85 MG/DL (ref 40–60)
HGB BLD-MCNC: 13.5 G/DL (ref 12–15.5)
LDLC: 109 MG/DL (ref 0–100)
MCH RBC QN AUTO: 30 PG (ref 25–35)
MCHC RBC AUTO-ENTMCNC: 34 G/DL (ref 31–37)
MCV RBC AUTO: 90 FL (ref 79–100)
PLATELET # BLD AUTO: 215 X10^3/UL (ref 140–400)
POTASSIUM SERPL-SCNC: 3.9 MMOL/L (ref 3.5–5.1)
PROT SERPL-MCNC: 7.5 G/DL (ref 6.4–8.2)
RBC # BLD AUTO: 4.48 X10^6/UL (ref 3.5–5.4)
SODIUM SERPL-SCNC: 137 MMOL/L (ref 136–145)
T4 FREE SERPL-MCNC: 1.12 NG/DL (ref 0.76–1.46)
THYROID STIM HORMONE (TSH): 0.13 UIU/ML (ref 0.36–3.74)
TRIGL SERPL-MCNC: 50 MG/DL (ref 0–150)
VLDLC: 10 MG/DL (ref 0–40)
WBC # BLD AUTO: 5.9 X10^3/UL (ref 4–11)

## 2021-09-28 PROCEDURE — 82306 VITAMIN D 25 HYDROXY: CPT

## 2021-09-28 PROCEDURE — 36415 COLL VENOUS BLD VENIPUNCTURE: CPT

## 2021-09-28 PROCEDURE — 84443 ASSAY THYROID STIM HORMONE: CPT

## 2021-09-28 PROCEDURE — 85027 COMPLETE CBC AUTOMATED: CPT

## 2021-09-28 PROCEDURE — 83036 HEMOGLOBIN GLYCOSYLATED A1C: CPT

## 2021-09-28 PROCEDURE — 80053 COMPREHEN METABOLIC PANEL: CPT

## 2021-09-28 PROCEDURE — 84439 ASSAY OF FREE THYROXINE: CPT

## 2021-09-28 PROCEDURE — 80061 LIPID PANEL: CPT

## 2022-05-23 ENCOUNTER — HOSPITAL ENCOUNTER (OUTPATIENT)
Dept: HOSPITAL 61 - RAD | Age: 62
End: 2022-05-23
Attending: FAMILY MEDICINE
Payer: COMMERCIAL

## 2022-05-23 DIAGNOSIS — J20.8: Primary | ICD-10-CM

## 2022-05-23 DIAGNOSIS — M43.8X5: ICD-10-CM

## 2022-05-23 DIAGNOSIS — E03.9: ICD-10-CM

## 2022-05-23 DIAGNOSIS — E74.39: ICD-10-CM

## 2022-05-23 LAB
ALBUMIN SERPL-MCNC: 4.3 G/DL (ref 3.4–5)
ALBUMIN/GLOB SERPL: 1 {RATIO} (ref 1–1.7)
ALP SERPL-CCNC: 98 U/L (ref 46–116)
ALT SERPL-CCNC: 23 U/L (ref 14–59)
ANION GAP SERPL CALC-SCNC: 10 MMOL/L (ref 6–14)
AST SERPL-CCNC: 16 U/L (ref 15–37)
BILIRUB SERPL-MCNC: 0.7 MG/DL (ref 0.2–1)
BUN SERPL-MCNC: 19 MG/DL (ref 7–20)
BUN/CREAT SERPL: 21 (ref 6–20)
CALCIUM SERPL-MCNC: 8.9 MG/DL (ref 8.5–10.1)
CHLORIDE SERPL-SCNC: 97 MMOL/L (ref 98–107)
CO2 SERPL-SCNC: 29 MMOL/L (ref 21–32)
CREAT SERPL-MCNC: 0.9 MG/DL (ref 0.6–1)
GFR SERPLBLD BASED ON 1.73 SQ M-ARVRAT: 63 ML/MIN
GLUCOSE SERPL-MCNC: 92 MG/DL (ref 70–99)
POTASSIUM SERPL-SCNC: 3.4 MMOL/L (ref 3.5–5.1)
PROT SERPL-MCNC: 8.6 G/DL (ref 6.4–8.2)
SODIUM SERPL-SCNC: 136 MMOL/L (ref 136–145)
T4 FREE SERPL-MCNC: 1.46 NG/DL (ref 0.76–1.46)
THYROID STIM HORMONE (TSH): 0.11 UIU/ML (ref 0.36–3.74)

## 2022-05-23 PROCEDURE — 83036 HEMOGLOBIN GLYCOSYLATED A1C: CPT

## 2022-05-23 PROCEDURE — 80053 COMPREHEN METABOLIC PANEL: CPT

## 2022-05-23 PROCEDURE — 36415 COLL VENOUS BLD VENIPUNCTURE: CPT

## 2022-05-23 PROCEDURE — 84439 ASSAY OF FREE THYROXINE: CPT

## 2022-05-23 PROCEDURE — 84443 ASSAY THYROID STIM HORMONE: CPT

## 2022-05-23 PROCEDURE — 71046 X-RAY EXAM CHEST 2 VIEWS: CPT

## 2022-05-23 NOTE — RAD
XR CHEST 2V



History: Reason: VIRAL BRONCHITIS. / Spl. Instructions:  / History: 



Comparison: None.



Findings:

No consolidation or pleural effusion. Normal heart size. No pneumothorax. Rightward curvature of the 
thoracolumbar spine.



Impression: 

1.  No acute cardiopulmonary process.



Electronically signed by: Ben Nix DO (5/23/2022 4:50 PM) [id number]

## 2022-05-24 LAB — HBA1C MFR BLD: 5.7 % (ref 4.8–5.6)

## 2022-05-26 ENCOUNTER — HOSPITAL ENCOUNTER (OUTPATIENT)
Dept: HOSPITAL 61 - LAB | Age: 62
End: 2022-05-26
Attending: FAMILY MEDICINE
Payer: COMMERCIAL

## 2022-05-26 DIAGNOSIS — R77.9: Primary | ICD-10-CM

## 2022-05-26 PROCEDURE — 84165 PROTEIN E-PHORESIS SERUM: CPT

## 2022-05-26 PROCEDURE — 36415 COLL VENOUS BLD VENIPUNCTURE: CPT

## 2022-05-27 LAB
ALBUM: 4 G/DL (ref 2.9–4.4)
ALPHA1 GLOB SERPL ELPH-MCNC: 0.3 G/DL (ref 0–0.4)
ALPHA2 GLOB SERPL ELPH-MCNC: 0.8 G/DL (ref 0.4–1)
B-GLOBULIN SERPL ELPH-MCNC: 1 G/DL (ref 0.7–1.3)
GAMMA GLOB SERPL ELPH-MCNC: 0.9 G/DL (ref 0.4–1.8)
PROT SERPL-MCNC: 7.1 G/DL (ref 6–8.5)
SPEP AG RATIO: 1.3 (ref 0.7–1.7)